# Patient Record
Sex: FEMALE | Race: WHITE | NOT HISPANIC OR LATINO
[De-identification: names, ages, dates, MRNs, and addresses within clinical notes are randomized per-mention and may not be internally consistent; named-entity substitution may affect disease eponyms.]

---

## 2018-12-05 ENCOUNTER — TRANSCRIPTION ENCOUNTER (OUTPATIENT)
Age: 53
End: 2018-12-05

## 2018-12-12 ENCOUNTER — TRANSCRIPTION ENCOUNTER (OUTPATIENT)
Age: 53
End: 2018-12-12

## 2019-07-01 ENCOUNTER — TRANSCRIPTION ENCOUNTER (OUTPATIENT)
Age: 54
End: 2019-07-01

## 2019-07-01 ENCOUNTER — EMERGENCY (EMERGENCY)
Facility: HOSPITAL | Age: 54
LOS: 1 days | Discharge: HOME | End: 2019-07-01
Attending: EMERGENCY MEDICINE
Payer: COMMERCIAL

## 2019-07-01 VITALS
OXYGEN SATURATION: 95 % | HEART RATE: 94 BPM | TEMPERATURE: 100 F | WEIGHT: 205.03 LBS | RESPIRATION RATE: 18 BRPM | DIASTOLIC BLOOD PRESSURE: 86 MMHG | HEIGHT: 63 IN | SYSTOLIC BLOOD PRESSURE: 140 MMHG

## 2019-07-01 LAB
ALBUMIN SERPL ELPH-MCNC: 4 G/DL — SIGNIFICANT CHANGE UP (ref 3.3–5)
ALP SERPL-CCNC: 83 U/L — SIGNIFICANT CHANGE UP (ref 40–120)
ALT FLD-CCNC: 16 U/L — SIGNIFICANT CHANGE UP (ref 10–45)
ANION GAP SERPL CALC-SCNC: 11 MMOL/L — SIGNIFICANT CHANGE UP (ref 5–17)
AST SERPL-CCNC: 16 U/L — SIGNIFICANT CHANGE UP (ref 10–40)
BASOPHILS # BLD AUTO: 0.1 K/UL — SIGNIFICANT CHANGE UP (ref 0–0.2)
BASOPHILS NFR BLD AUTO: 0.5 % — SIGNIFICANT CHANGE UP (ref 0–2)
BILIRUB SERPL-MCNC: 1.2 MG/DL — SIGNIFICANT CHANGE UP (ref 0.2–1.2)
BUN SERPL-MCNC: 10 MG/DL — SIGNIFICANT CHANGE UP (ref 7–23)
CALCIUM SERPL-MCNC: 9 MG/DL — SIGNIFICANT CHANGE UP (ref 8.4–10.5)
CHLORIDE SERPL-SCNC: 102 MMOL/L — SIGNIFICANT CHANGE UP (ref 96–108)
CO2 SERPL-SCNC: 24 MMOL/L — SIGNIFICANT CHANGE UP (ref 22–31)
CREAT SERPL-MCNC: 0.68 MG/DL — SIGNIFICANT CHANGE UP (ref 0.5–1.3)
D DIMER BLD IA.RAPID-MCNC: 218 NG/ML DDU — SIGNIFICANT CHANGE UP
EOSINOPHIL # BLD AUTO: 0.1 K/UL — SIGNIFICANT CHANGE UP (ref 0–0.5)
EOSINOPHIL NFR BLD AUTO: 1 % — SIGNIFICANT CHANGE UP (ref 0–6)
FLU A RESULT: SIGNIFICANT CHANGE UP
FLU A RESULT: SIGNIFICANT CHANGE UP
FLUAV AG NPH QL: SIGNIFICANT CHANGE UP
FLUBV AG NPH QL: SIGNIFICANT CHANGE UP
GLUCOSE SERPL-MCNC: 103 MG/DL — HIGH (ref 70–99)
HCT VFR BLD CALC: 44.7 % — SIGNIFICANT CHANGE UP (ref 34.5–45)
HGB BLD-MCNC: 15.3 G/DL — SIGNIFICANT CHANGE UP (ref 11.5–15.5)
LYMPHOCYTES # BLD AUTO: 18.6 % — SIGNIFICANT CHANGE UP (ref 13–44)
LYMPHOCYTES # BLD AUTO: 2 K/UL — SIGNIFICANT CHANGE UP (ref 1–3.3)
MCHC RBC-ENTMCNC: 32.1 PG — SIGNIFICANT CHANGE UP (ref 27–34)
MCHC RBC-ENTMCNC: 34.3 GM/DL — SIGNIFICANT CHANGE UP (ref 32–36)
MCV RBC AUTO: 93.7 FL — SIGNIFICANT CHANGE UP (ref 80–100)
MONOCYTES # BLD AUTO: 1 K/UL — HIGH (ref 0–0.9)
MONOCYTES NFR BLD AUTO: 9.6 % — SIGNIFICANT CHANGE UP (ref 2–14)
NEUTROPHILS # BLD AUTO: 7.6 K/UL — HIGH (ref 1.8–7.4)
NEUTROPHILS NFR BLD AUTO: 70.4 % — SIGNIFICANT CHANGE UP (ref 43–77)
PLATELET # BLD AUTO: 230 K/UL — SIGNIFICANT CHANGE UP (ref 150–400)
POTASSIUM SERPL-MCNC: 4.1 MMOL/L — SIGNIFICANT CHANGE UP (ref 3.5–5.3)
POTASSIUM SERPL-SCNC: 4.1 MMOL/L — SIGNIFICANT CHANGE UP (ref 3.5–5.3)
PROT SERPL-MCNC: 7.4 G/DL — SIGNIFICANT CHANGE UP (ref 6–8.3)
RBC # BLD: 4.78 M/UL — SIGNIFICANT CHANGE UP (ref 3.8–5.2)
RBC # FLD: 12.6 % — SIGNIFICANT CHANGE UP (ref 10.3–14.5)
RSV RESULT: SIGNIFICANT CHANGE UP
RSV RNA RESP QL NAA+PROBE: SIGNIFICANT CHANGE UP
SODIUM SERPL-SCNC: 137 MMOL/L — SIGNIFICANT CHANGE UP (ref 135–145)
TROPONIN T, HIGH SENSITIVITY RESULT: 7 NG/L — SIGNIFICANT CHANGE UP (ref 0–51)
TROPONIN T, HIGH SENSITIVITY RESULT: 7 NG/L — SIGNIFICANT CHANGE UP (ref 0–51)
WBC # BLD: 10.7 K/UL — HIGH (ref 3.8–10.5)
WBC # FLD AUTO: 10.7 K/UL — HIGH (ref 3.8–10.5)

## 2019-07-01 PROCEDURE — 99220: CPT

## 2019-07-01 PROCEDURE — 93971 EXTREMITY STUDY: CPT | Mod: 26

## 2019-07-01 PROCEDURE — 71046 X-RAY EXAM CHEST 2 VIEWS: CPT | Mod: 26

## 2019-07-01 PROCEDURE — 71045 X-RAY EXAM CHEST 1 VIEW: CPT | Mod: 26,59

## 2019-07-01 RX ORDER — IPRATROPIUM/ALBUTEROL SULFATE 18-103MCG
3 AEROSOL WITH ADAPTER (GRAM) INHALATION EVERY 4 HOURS
Refills: 0 | Status: DISCONTINUED | OUTPATIENT
Start: 2019-07-01 | End: 2019-07-05

## 2019-07-01 RX ORDER — DIAZEPAM 5 MG
5 TABLET ORAL EVERY 8 HOURS
Refills: 0 | Status: DISCONTINUED | OUTPATIENT
Start: 2019-07-01 | End: 2019-07-01

## 2019-07-01 RX ORDER — SODIUM CHLORIDE 9 MG/ML
1000 INJECTION INTRAMUSCULAR; INTRAVENOUS; SUBCUTANEOUS ONCE
Refills: 0 | Status: COMPLETED | OUTPATIENT
Start: 2019-07-01 | End: 2019-07-01

## 2019-07-01 RX ORDER — SODIUM CHLORIDE 9 MG/ML
1000 INJECTION INTRAMUSCULAR; INTRAVENOUS; SUBCUTANEOUS
Refills: 0 | Status: DISCONTINUED | OUTPATIENT
Start: 2019-07-01 | End: 2019-07-05

## 2019-07-01 RX ORDER — KETOROLAC TROMETHAMINE 30 MG/ML
15 SYRINGE (ML) INJECTION EVERY 6 HOURS
Refills: 0 | Status: DISCONTINUED | OUTPATIENT
Start: 2019-07-01 | End: 2019-07-01

## 2019-07-01 RX ORDER — ASPIRIN/CALCIUM CARB/MAGNESIUM 324 MG
162 TABLET ORAL ONCE
Refills: 0 | Status: COMPLETED | OUTPATIENT
Start: 2019-07-01 | End: 2019-07-01

## 2019-07-01 RX ADMIN — Medication 15 MILLIGRAM(S): at 20:55

## 2019-07-01 RX ADMIN — SODIUM CHLORIDE 1000 MILLILITER(S): 9 INJECTION INTRAMUSCULAR; INTRAVENOUS; SUBCUTANEOUS at 18:13

## 2019-07-01 RX ADMIN — SODIUM CHLORIDE 125 MILLILITER(S): 9 INJECTION INTRAMUSCULAR; INTRAVENOUS; SUBCUTANEOUS at 20:55

## 2019-07-01 RX ADMIN — Medication 5 MILLIGRAM(S): at 20:55

## 2019-07-01 RX ADMIN — Medication 3 MILLILITER(S): at 18:17

## 2019-07-01 RX ADMIN — Medication 15 MILLIGRAM(S): at 21:30

## 2019-07-01 RX ADMIN — Medication 162 MILLIGRAM(S): at 15:35

## 2019-07-01 NOTE — ED CDU PROVIDER INITIAL DAY NOTE - PROGRESS NOTE DETAILS
CDU NOTE DONNA Turner: pt resting, pt reports able to cough up sputum after neb treatment. no new complaints. NAD VSS. no events on tele. CDU PROGRESS NOTE DONNA HALL: Received pt at 1900 sign-out. Pt resting in stretcher in NAD. Case/plan reviewed. VSS. Pt ambulatory around unit with steady gait. S1 S2 noted, RRR, lungs CTA b/l, BS x4 with soft, nontender abdomen. Pt reports having discomfort on coughing but states she is feeling better. Will give Toradol 15mg IV and continue to monitor. CDU PROGRESS NOTE PA ISABEL: Pt resting comfortably, feeling well without complaint. NAD, VSS. No events on telemetry.

## 2019-07-01 NOTE — ED ADULT NURSE NOTE - OBJECTIVE STATEMENT
1245 54 yr old WF brought to ER by  for further eval and tx of upper chest pain x 3 days and low grade fever. A&Ox4. color pink. skin W&D. Coughing. smokes 1pack/day. Pain increase when moving around. Denies dizziness. No recent injury or heavy lifting

## 2019-07-01 NOTE — ED CDU PROVIDER INITIAL DAY NOTE - MEDICAL DECISION MAKING DETAILS
ATTG: : transfer to CDU for frequent reeval, vitals per routine, tele,  TTE, pain control, IVF, nebs PRN, +/- abx depending on if pt continues to spike fevers

## 2019-07-01 NOTE — ED PROVIDER NOTE - ATTENDING CONTRIBUTION TO CARE
53 y/o f with pmhx Tob smoker, presents for shortness of breath and chest pain since Friday. Worsening today and unable to sleep last night. went to an urgent care today and was found to have pain on back of right calf so was sent to ED for further work up and care. no fever measured at home. pain in middle of chest, cough non productive. unable to sleep last night from discomfort.  Gen.  no acute distress  HEENT:  perrl eomi  Lungs:  left base coarse bs, no retraction. O2 93- 95% on room air.  CVS: S1S2   Abd; soft non tender no distention    Ext: no pitting edema or erythema. no calf tenderness  Neuro: aaox3 no focal deficits  MSK:  strength 5/5 b/l upper and lower ext strength. ambulates in ED without difficulty.

## 2019-07-01 NOTE — ED PROVIDER NOTE - CLINICAL SUMMARY MEDICAL DECISION MAKING FREE TEXT BOX
ATTG: : chest pain / calf tenderness concern for Cardiac / pulm / vasc causes will check xray chest, check labs, check US, check ekg, re eval for dispo.

## 2019-07-01 NOTE — ED CDU PROVIDER DISPOSITION NOTE - PLAN OF CARE
1. Follow up with Medicine clinic, call [phone] to schedule appointment this week.  2. Show copies of your reports given to you. Take all of your other medications as previously prescribed.   3. Worsening or continued chest pain, shortness of breath, weakness, return to ED.

## 2019-07-01 NOTE — ED ADULT TRIAGE NOTE - OTHER COMPLAINTS
Pt was called three times to be traiged by=ut she claimed she did not hear the triage nurses calling her/ EKG done

## 2019-07-01 NOTE — ED PROVIDER NOTE - OBJECTIVE STATEMENT
54F hx obesity, daily smoker, p/w chest pain, pressure like, exertional, non-radiating, in middle of chest, assoc w/ sob (also exertional) constat, since saturday, worsening. Never had this pain before. Does not follow with a PMD or cardiologist.  Denies f/c, cough, ab pain, n/v/d.

## 2019-07-01 NOTE — ED ADULT NURSE NOTE - ED STAT RN HANDOFF DETAILS
hand off given to oncoming RN Sabina Hernandez. Droplet isolation initiated. Nasal swab was sent to lab. continues to c/o upper chest pain bilat 8/10 Dr espinosa. 2nd Troponin drawn and sent

## 2019-07-01 NOTE — ED CDU PROVIDER DISPOSITION NOTE - CLINICAL COURSE
54F hx obesity, daily smoker, presents to ED for mid-sternal chest pressure, severe, worse with deep inspiration, certain movement/positioning (also worse with lying flat), and deep palpation of chest. pt reports symptoms started Saturday (2 days ago) while doing her normal house work and since pain continues to worsen. pt states that she initially started to think she was becoming "sick" because has also been having on and off chills and raspy voice that began Saturday and returned today. +fever of 100.4 last night, and 100.2 here. no other complaints. denies n/v/d, abdominal pain, numbness/tingling, pain to jaw or arm. denies cough unusual to her. pt has chronic cough in mornings from smoking, but no new cough symptoms.  In ED, patient had ekg no signs of acute ischemia, Troponin x 2 negative, ddimer negative, flu and rsv negative, chest x ray showing left basilar atelectasis and US duplex lower extremity showing There is no evidence of proximal deep venous thrombosis.  Calf vein thrombosis and focal segmental deep venous thrombosis cannot be excluded. The patient should have a repeat lower extremity ultrasound in 5-7 days. Pt sent to CDU for frequent reeval, vitals q 4hrs, telemetry monitoring, Transthoracic Echo, pain control. 54F hx obesity, daily smoker, presents to ED for mid-sternal chest pressure, severe, worse with deep inspiration, certain movement/positioning (also worse with lying flat), and deep palpation of chest. pt reports symptoms started Saturday (2 days ago) while doing her normal house work and since pain continues to worsen. pt states that she initially started to think she was becoming "sick" because has also been having on and off chills and raspy voice that began Saturday and returned today. +fever of 100.4 last night, and 100.2 here. no other complaints. denies n/v/d, abdominal pain, numbness/tingling, pain to jaw or arm. denies cough unusual to her. pt has chronic cough in mornings from smoking, but no new cough symptoms.  In ED, patient had ekg no signs of acute ischemia, Troponin x 2 negative, ddimer negative, flu and rsv negative, chest x ray showing left basilar atelectasis and US duplex lower extremity showing There is no evidence of proximal deep venous thrombosis.  Calf vein thrombosis and focal segmental deep venous thrombosis cannot be excluded. The patient should have a repeat lower extremity ultrasound in 5-7 days. Pt sent to CDU for frequent reeval, vitals q 4hrs, telemetry monitoring, Transthoracic Echo, pain control.  Patient remained afebrile in CDU. No events on telemetry. Patient feeling much better in AM and throughout day. TTE unremarkable. Patient stable for d/c to f/u with cards and PCP as outpatient.

## 2019-07-01 NOTE — ED ADULT NURSE NOTE - NSIMPLEMENTINTERV_GEN_ALL_ED
Implemented All Universal Safety Interventions:  [place] to call system. Call bell, personal items and telephone within reach. Instruct patient to call for assistance. Room bathroom lighting operational. Non-slip footwear when patient is off stretcher. Physically safe environment: no spills, clutter or unnecessary equipment. Stretcher in lowest position, wheels locked, appropriate side rails in place.

## 2019-07-01 NOTE — ED CDU PROVIDER INITIAL DAY NOTE - ATTENDING CONTRIBUTION TO CARE
55 y/o f with pmhx Tob smoker, presents for shortness of breath and chest pain since Friday. Worsening today and unable to sleep last night. went to an urgent care today and was found to have pain on back of right calf so was sent to ED for further work up and care. no fever measured at home. pain in middle of chest, cough non productive. unable to sleep last night from discomfort.  Gen.  no acute distress  HEENT:  perrl eomi  Lungs:  left base coarse bs, no retraction. O2 93- 95% on room air.  CVS: S1S2   Abd; soft non tender no distention    Ext: no pitting edema or erythema. no calf tenderness  Neuro: aaox3 no focal deficits  MSK:  strength 5/5 b/l upper and lower ext strength. ambulates in ED without difficulty.

## 2019-07-01 NOTE — ED CDU PROVIDER DISPOSITION NOTE - NSFOLLOWUPINSTRUCTIONS_ED_ALL_ED_FT
Rest. Stay well hydrated.     Follow up with your PMD and cardiologist upon discharge; show copies of your reports given to you.    You may follow up with [facility]: call [phone] to make appointment.   You may follow up with [facility], please call [phone] to make an appointment.     ***Be sure to follow up for repeat doppler of your leg.     Return to ED for worsening or continued chest pain, shortness of breath, weakness, or any other concerning symptoms.

## 2019-07-01 NOTE — ED CDU PROVIDER INITIAL DAY NOTE - DETAILS
frequent reeval, vitals per routine, tele,  TTE, pain control, IVF, nebs PRN, +/- abx depending on if pt continues to spike fevers  d/w attending

## 2019-07-01 NOTE — ED CDU PROVIDER DISPOSITION NOTE - ATTENDING CONTRIBUTION TO CARE
Patient serially evaluated throughout emergency department course. There was no acute deterioration up to this time in the department. Patient has demonstrated marked clinical improvement, feels better at this time according to emergency department team. Agree with goals/plan of emergency department care as described in electronic medical record, including diagnostics, therapeutics and consultation as clinically warranted. Will discharge home with close outpatient followup with primary care physician/provider and specialist if necessary. Patient educated on concerning signs and features to return to the emergency department including but not limited to: nausea, vomiting, fever, chills, persistent/worsening symptoms or any concerns at all. No immediate life threatening issues present on history or clinical exam. Patient is a safe disposition home, has capacity and insight into their condition, is ambulatory in the Emergency Department with no further questions and will follow up with their doctor(s) this week. Patient understands anticipatory guidance and was given strict return and follow up precautions. The patient has been informed of all concerning signs and symptoms to return to Emergency Department, the necessity to follow up with the PMD/Clinic/follow up provided within 2-3 days was explained, and the patient reports understanding of above with capacity and insight.

## 2019-07-01 NOTE — ED CDU PROVIDER INITIAL DAY NOTE - OBJECTIVE STATEMENT
54F hx obesity, daily smoker, presents to ED for mid-sternal chest pressure, severe, worse with deep inspiration, certain movement/positioning (also worse with lying flat), and deep palpation of chest. pt reports symptoms started Saturday (2 days ago) while doing her normal house work and since pain continues to worsen. pt states that she initally started to think she was becoming "sick" because has also been having on and off chills and raspy voice that began saturday and returned today. +fever of 100.4 last night, and 100.2 here. some dizziness if bend forward and then stand straight. no other complaints. +sob.  denies n/v/d, abdominal pain, numbness/tingling, pain to jaw or arm. denies cough unusual to her. pt has chronic cough in mornings from smoking, but no new cough symptoms.

## 2019-07-02 VITALS
RESPIRATION RATE: 18 BRPM | OXYGEN SATURATION: 94 % | TEMPERATURE: 99 F | HEART RATE: 75 BPM | DIASTOLIC BLOOD PRESSURE: 65 MMHG | SYSTOLIC BLOOD PRESSURE: 106 MMHG

## 2019-07-02 LAB
BASOPHILS # BLD AUTO: 0.1 K/UL — SIGNIFICANT CHANGE UP (ref 0–0.2)
BASOPHILS NFR BLD AUTO: 0.6 % — SIGNIFICANT CHANGE UP (ref 0–2)
CHOLEST SERPL-MCNC: 198 MG/DL — SIGNIFICANT CHANGE UP (ref 10–199)
EOSINOPHIL # BLD AUTO: 0.2 K/UL — SIGNIFICANT CHANGE UP (ref 0–0.5)
EOSINOPHIL NFR BLD AUTO: 1.7 % — SIGNIFICANT CHANGE UP (ref 0–6)
HBA1C BLD-MCNC: 5.6 % — SIGNIFICANT CHANGE UP (ref 4–5.6)
HCT VFR BLD CALC: 43.4 % — SIGNIFICANT CHANGE UP (ref 34.5–45)
HDLC SERPL-MCNC: 40 MG/DL — LOW
HGB BLD-MCNC: 14.3 G/DL — SIGNIFICANT CHANGE UP (ref 11.5–15.5)
LIPID PNL WITH DIRECT LDL SERPL: 141 MG/DL — HIGH
LYMPHOCYTES # BLD AUTO: 2.2 K/UL — SIGNIFICANT CHANGE UP (ref 1–3.3)
LYMPHOCYTES # BLD AUTO: 23.6 % — SIGNIFICANT CHANGE UP (ref 13–44)
MCHC RBC-ENTMCNC: 30.9 PG — SIGNIFICANT CHANGE UP (ref 27–34)
MCHC RBC-ENTMCNC: 33 GM/DL — SIGNIFICANT CHANGE UP (ref 32–36)
MCV RBC AUTO: 93.4 FL — SIGNIFICANT CHANGE UP (ref 80–100)
MONOCYTES # BLD AUTO: 0.8 K/UL — SIGNIFICANT CHANGE UP (ref 0–0.9)
MONOCYTES NFR BLD AUTO: 8.8 % — SIGNIFICANT CHANGE UP (ref 2–14)
NEUTROPHILS # BLD AUTO: 6 K/UL — SIGNIFICANT CHANGE UP (ref 1.8–7.4)
NEUTROPHILS NFR BLD AUTO: 65.3 % — SIGNIFICANT CHANGE UP (ref 43–77)
PLATELET # BLD AUTO: 221 K/UL — SIGNIFICANT CHANGE UP (ref 150–400)
RBC # BLD: 4.65 M/UL — SIGNIFICANT CHANGE UP (ref 3.8–5.2)
RBC # FLD: 12.7 % — SIGNIFICANT CHANGE UP (ref 10.3–14.5)
TOTAL CHOLESTEROL/HDL RATIO MEASUREMENT: 5 RATIO — SIGNIFICANT CHANGE UP (ref 3.3–7.1)
TRIGL SERPL-MCNC: 85 MG/DL — SIGNIFICANT CHANGE UP (ref 10–149)
WBC # BLD: 9.2 K/UL — SIGNIFICANT CHANGE UP (ref 3.8–10.5)
WBC # FLD AUTO: 9.2 K/UL — SIGNIFICANT CHANGE UP (ref 3.8–10.5)

## 2019-07-02 PROCEDURE — 85027 COMPLETE CBC AUTOMATED: CPT

## 2019-07-02 PROCEDURE — 93306 TTE W/DOPPLER COMPLETE: CPT | Mod: 26

## 2019-07-02 PROCEDURE — 84484 ASSAY OF TROPONIN QUANT: CPT

## 2019-07-02 PROCEDURE — 80061 LIPID PANEL: CPT

## 2019-07-02 PROCEDURE — 83036 HEMOGLOBIN GLYCOSYLATED A1C: CPT

## 2019-07-02 PROCEDURE — 85379 FIBRIN DEGRADATION QUANT: CPT

## 2019-07-02 PROCEDURE — 93971 EXTREMITY STUDY: CPT

## 2019-07-02 PROCEDURE — 71045 X-RAY EXAM CHEST 1 VIEW: CPT

## 2019-07-02 PROCEDURE — 93306 TTE W/DOPPLER COMPLETE: CPT

## 2019-07-02 PROCEDURE — 99217: CPT

## 2019-07-02 PROCEDURE — 87631 RESP VIRUS 3-5 TARGETS: CPT

## 2019-07-02 PROCEDURE — 96374 THER/PROPH/DIAG INJ IV PUSH: CPT

## 2019-07-02 PROCEDURE — 71046 X-RAY EXAM CHEST 2 VIEWS: CPT

## 2019-07-02 PROCEDURE — G0378: CPT

## 2019-07-02 PROCEDURE — 94640 AIRWAY INHALATION TREATMENT: CPT

## 2019-07-02 PROCEDURE — 99284 EMERGENCY DEPT VISIT MOD MDM: CPT | Mod: 25

## 2019-07-02 PROCEDURE — 80053 COMPREHEN METABOLIC PANEL: CPT

## 2019-07-02 PROCEDURE — 93005 ELECTROCARDIOGRAM TRACING: CPT

## 2019-07-02 RX ADMIN — Medication 3 MILLILITER(S): at 10:59

## 2019-07-02 NOTE — ED ADULT NURSE REASSESSMENT NOTE - NS ED NURSE REASSESS COMMENT FT1
16.30  Pt is reviewed by CDU MD Rodrick George  with all the results. pt is discharged ML out.  DONNA Randolph explained the follow up care & gave the discharge summary. Pt verbalized the understanding on follow up care . Pt has steady gait  stable vitals  Rivka CP SOB   Pt stable to go home.
Pt report received from Denice HYATT. Pt transferred to cdu Bed 7 for cardiac monitoring and Echo. Pt a/ox3 denies respiratory distress, sob, dyspnea, C/P, palpitations, n/v/d at this time. Pt states symptoms have improved. VSS, afebrile, IV clean/dry/intact. Pt aware of plan of care, call bell within reach ,safety maintained. Will monitor and assess.
Report received from EDMAR Kimball. Pt a&oX4, resting comfortably in bed in no apparent distress, breathing spontaneously and unlabored. VSS. Pt reports chest discomfort/tenderness. Pt denies numbness/tingling. CDU at bedside for evaluation. Plan is for pt to go to CDU after RVP results. Pt aware of plan of care. safety maintained.
07.00 Am  Pt received from EDMAR Angela pt is observed for SOB  09.00  Am  Pt reassessed  Pt oriented to CDU & plan of care was discussed. Pt is  AXOX4..denies N/V/D fever chills CP  .Pt states her SOB is improving post nebulized treatments . pt is not wheezing  Safety & comfort measures maintained. Call bell in reach. Pt advised to call for help if needed IV  site looks clean &Dry no signs of infiltration noted  Will continue to monitor.
Pt received from EDMAR Louie. Pt oriented to CDU & plan of care was discussed. Pt endorses unchanged 8/10 chest pain described as pressure. Pain is not worse with palpation but pt states she has soreness near the breasts and the pressure is exacerbated with movement. DONNA Mg aware. Medicated as per MAR. SOB noted while speaking to pt which patient says is not normal for her and started about 3 days ago. Pt states that nebulizer treatment is helping break up mucus and is allowing her to cough up phlegm. On examination clear breath sounds noted. Pt placed on continuous pulse ox for further management sating >04% on room air. Pt denies any dizziness or palpitations. Safety & comfort measures maintained. Call bell in reach. Will continue to monitor.

## 2019-07-02 NOTE — ED ADULT NURSE REASSESSMENT NOTE - NSIMPLEMENTINTERV_GEN_ALL_ED
Implemented All Universal Safety Interventions:  [place] to call system. Call bell, personal items and telephone within reach. Instruct patient to call for assistance. Room bathroom lighting operational. Non-slip footwear when patient is off stretcher. Physically safe environment: no spills, clutter or unnecessary equipment. Stretcher in lowest position, wheels locked, appropriate side rails in place.

## 2019-07-02 NOTE — ED CDU PROVIDER SUBSEQUENT DAY NOTE - HISTORY
CDU PROGRESS NOTE PA ISABEL: Pt resting comfortably, without complaint. NAD, VSS. No events on telemetry.

## 2019-07-02 NOTE — ED ADULT NURSE REASSESSMENT NOTE - RESPIRATORY WDL
Breathing spontaneous and unlabored. Breath sounds clear and equal bilaterally with regular rhythm.
Breathing spontaneous and unlabored with regular rhythm.

## 2019-07-02 NOTE — ED ADULT NURSE REASSESSMENT NOTE - COMFORT CARE
po fluids offered/ambulated to bathroom/plan of care explained
plan of care explained/po fluids offered

## 2019-07-02 NOTE — ED CDU PROVIDER SUBSEQUENT DAY NOTE - PROGRESS NOTE DETAILS
Patient taken for echo during signout at 0705. Will evaluate upon arrival back to CDU. Pt reports improvement of chest discomfort. No longer feels SOB/pleuritic pain. No fever/chills. + cough productive of yellow sputum today. VSS. Lungs CTA b/l on exam. No events on telemetry. Pending TTE results. Patient resting in bed comfortably in NAD. No complaints. Most recent VSS. No events on telemetry monitor. Pending TTE results. TTE unremarkable. Patient reports she has felt much better all day. VSS. On exam, lungs remain CTA b/l. No events on telemetry. Patient stable for d/c home. Advised smoking cessation. D/w Dr. Mensah. TTE unremarkable. Patient reports she has felt much better all day. VSS. On exam, lungs remain CTA b/l. No events on telemetry. Patient stable for d/c home. Reports she will f/u with her 's cardiologist. Will also provide PCP f/u with [facility] clinic. Advised smoking cessation. D/w Dr. Mensah. TTE unremarkable. Patient reports she has felt much better all day. VSS. On exam, lungs remain CTA b/l. No events on telemetry. Patient stable for d/c home. Reports she will f/u with her 's cardiologist. Will also provide PCP f/u with [facility] clinic. Results discussed including but not limited to lipid panel results and RLE doppler with recommended f/u for repeat doppler in 5-7 days. Advised smoking cessation. D/w Dr. Mensah.

## 2019-07-02 NOTE — ED CDU PROVIDER SUBSEQUENT DAY NOTE - MEDICAL DECISION MAKING DETAILS
See observation monitoring plan section from initial day note.   Patient with cough, history of smoking, yellow sputum production, pending tte   if within normal limits patient to be discharged and  follow up with pmd

## 2019-08-26 ENCOUNTER — APPOINTMENT (OUTPATIENT)
Dept: INTERNAL MEDICINE | Facility: CLINIC | Age: 54
End: 2019-08-26
Payer: COMMERCIAL

## 2019-08-26 ENCOUNTER — NON-APPOINTMENT (OUTPATIENT)
Age: 54
End: 2019-08-26

## 2019-08-26 VITALS
SYSTOLIC BLOOD PRESSURE: 130 MMHG | HEART RATE: 94 BPM | TEMPERATURE: 98.1 F | WEIGHT: 204 LBS | DIASTOLIC BLOOD PRESSURE: 70 MMHG | OXYGEN SATURATION: 95 %

## 2019-08-26 VITALS — BODY MASS INDEX: 37.31 KG/M2 | HEIGHT: 62 IN

## 2019-08-26 DIAGNOSIS — L72.9 FOLLICULAR CYST OF THE SKIN AND SUBCUTANEOUS TISSUE, UNSPECIFIED: ICD-10-CM

## 2019-08-26 PROCEDURE — 90732 PPSV23 VACC 2 YRS+ SUBQ/IM: CPT

## 2019-08-26 PROCEDURE — G0009: CPT

## 2019-08-26 PROCEDURE — 99406 BEHAV CHNG SMOKING 3-10 MIN: CPT | Mod: 25

## 2019-08-26 PROCEDURE — 99204 OFFICE O/P NEW MOD 45 MIN: CPT | Mod: 25

## 2019-08-26 NOTE — PHYSICAL EXAM
[No Acute Distress] : no acute distress [Well Nourished] : well nourished [Well Developed] : well developed [Well-Appearing] : well-appearing [Normal Sclera/Conjunctiva] : normal sclera/conjunctiva [PERRL] : pupils equal round and reactive to light [Normal Outer Ear/Nose] : the outer ears and nose were normal in appearance [EOMI] : extraocular movements intact [No JVD] : no jugular venous distention [Normal Oropharynx] : the oropharynx was normal [No Lymphadenopathy] : no lymphadenopathy [Supple] : supple [Thyroid Normal, No Nodules] : the thyroid was normal and there were no nodules present [No Respiratory Distress] : no respiratory distress  [No Accessory Muscle Use] : no accessory muscle use [Clear to Auscultation] : lungs were clear to auscultation bilaterally [Regular Rhythm] : with a regular rhythm [Normal Rate] : normal rate  [Normal S1, S2] : normal S1 and S2 [No Murmur] : no murmur heard [No Carotid Bruits] : no carotid bruits [No Abdominal Bruit] : a ~M bruit was not heard ~T in the abdomen [No Varicosities] : no varicosities [Pedal Pulses Present] : the pedal pulses are present [No Edema] : there was no peripheral edema [No Palpable Aorta] : no palpable aorta [No Extremity Clubbing/Cyanosis] : no extremity clubbing/cyanosis [Normal Appearance] : normal in appearance [No Axillary Lymphadenopathy] : no axillary lymphadenopathy [Soft] : abdomen soft [Non Tender] : non-tender [Non-distended] : non-distended [No Masses] : no abdominal mass palpated [No HSM] : no HSM [Normal Bowel Sounds] : normal bowel sounds [Normal Posterior Cervical Nodes] : no posterior cervical lymphadenopathy [Normal Anterior Cervical Nodes] : no anterior cervical lymphadenopathy [No CVA Tenderness] : no CVA  tenderness [No Spinal Tenderness] : no spinal tenderness [No Joint Swelling] : no joint swelling [Grossly Normal Strength/Tone] : grossly normal strength/tone [No Rash] : no rash [Coordination Grossly Intact] : coordination grossly intact [No Focal Deficits] : no focal deficits [Normal Gait] : normal gait [Deep Tendon Reflexes (DTR)] : deep tendon reflexes were 2+ and symmetric [Normal Affect] : the affect was normal [de-identified] : lesion right aspect of nose [Normal Insight/Judgement] : insight and judgment were intact [de-identified] : anxious

## 2019-08-26 NOTE — ASSESSMENT
[FreeTextEntry1] : New pt with above issues;\par has not had any care x yrs\par Needs to catch up with HCMs in addition to getting help with anxiety and smoking.\par Wt loss, also urgent. Starting WW w .\par Positive  f/h CAD early in parents.\par \par Pneumovax 23 given\par Chantix sent\par labs uptodate\par Refer to :\par Cardiol\par Gyn\par Derm\par Psych\par \par Mammo\par FIT\par \par Requests Klonopin which has helped in past, we discussed addictive potential which she is aware;\par wants to tackle smoking w chantix before going on SSRI.\par f/u 1-2 mos\par \par Gy

## 2019-08-26 NOTE — COUNSELING
[Behavioral health counseling provided] : Behavioral health counseling provided [Cessation strategies including cessation program discussed] : Cessation strategies including cessation program discussed [Tobacco Use Cessation Intermediate Greater Than 3 Minutes Up to 10 Minutes] : Tobacco Use Cessation Intermediate Greater Than 3 Minutes Up to 10 Minutes [Willing to Quit Smoking] : Willing to quit smoking [Benefits of weight loss discussed] : Benefits of weight loss discussed [Structured Weight Management Program suggested:] : Structured weight management program suggested [FreeTextEntry1] : [place]

## 2019-08-26 NOTE — HISTORY OF PRESENT ILLNESS
[FreeTextEntry1] : cpe-new [de-identified] : 53 yo woman to establish care.\par No prior PCP\par \par \par h/o obesity, anxiety, agorophobia, smoker.\par \par Recently went to [facility] care w low gr temp and calf pain,  URI also CP/pressure-\par sent to ER. was kept overnight-CXR atelectasis, d-dimer nl, LE dopplers nl, labs/troponin nl, cholest sl elev, \par treated w aspirin 81 mg, Ketorolac IV, Valium 5 mg, albuterol neb.\par IV fluids.\par Felt much better and pain was gone; she says it was viral and anxiety.\par Has not been under the care of a therapist or psychiatrist, feels she needs help.\par Motivated  to quit smoking, smokes one PPD x 30 yrs, did quit during one preg, not the other;\par  states wellbutrin did not work but Chantix worked well in the past (took her dad's after he ).\par Has been on [place], zoloft, Klonopin, wellbutrin over the yrs, nothing recently.\par Tianna uses Zquill and/or CBD oil to sleep.\par High stress levels- son, 22, has special needs/hi func autism, she drives him every day to programs and cares for whole family, not taking care of herself.\par Has not tried any diets/wt loss programs but plans to do WW with her husb; also not active, mostly "in the car" all day.\par \par Mammo:yrs\par Gyn: yrs\par [place]: no\par Derm:no\par \par Vax;no\par \par f/h Mother d. 54 MI\par      Father first MI age 39, d. 68 smoker/drinker\par

## 2019-08-26 NOTE — HEALTH RISK ASSESSMENT
[Fair] :  ~his/her~ mood as fair [] : Yes [No] : No [1] : 2) Feeling down, depressed, or hopeless for several days (1) [HIV test declined] : HIV test declined [Hepatitis C test declined] : Hepatitis C test declined [Behavioral] : behavioral [With Family] : lives with family [Unemployed] : unemployed [Patient declined mammogram] : Patient declined mammogram [Patient declined PAP Smear] : Patient declined PAP Smear [Patient declined bone density test] : Patient declined bone density test [Patient declined colonoscopy] : Patient declined colonoscopy [] :  [# Of Children ___] : has [unfilled] children [Fully functional (bathing, dressing, toileting, transferring, walking, feeding)] : Fully functional (bathing, dressing, toileting, transferring, walking, feeding) [Feels Safe at Home] : Feels safe at home [Fully functional (using the telephone, shopping, preparing meals, housekeeping, doing laundry, using] : Fully functional and needs no help or supervision to perform IADLs (using the telephone, shopping, preparing meals, housekeeping, doing laundry, using transportation, managing medications and managing finances) [Carbon Monoxide Detector] : carbon monoxide detector [Smoke Detector] : smoke detector [Seat Belt] :  uses seat belt [Safety elements used in home] : safety elements used in home [Sunscreen] : uses sunscreen [Caregiver Concerns] : has caregiver concerns [Language] : denies difficulty with language [Change in mental status noted] : No change in mental status noted [Reports changes in vision] : Reports no changes in vision [Reports changes in hearing] : Reports no changes in hearing [Reports changes in dental health] : Reports no changes in dental health [Travel to Developing Areas] : does not  travel to developing areas [FreeTextEntry3] : dgtr in college, son special needs

## 2019-09-26 ENCOUNTER — MEDICATION RENEWAL (OUTPATIENT)
Age: 54
End: 2019-09-26

## 2019-10-28 ENCOUNTER — APPOINTMENT (OUTPATIENT)
Dept: INTERNAL MEDICINE | Facility: CLINIC | Age: 54
End: 2019-10-28
Payer: COMMERCIAL

## 2019-10-28 VITALS
SYSTOLIC BLOOD PRESSURE: 122 MMHG | HEIGHT: 62 IN | DIASTOLIC BLOOD PRESSURE: 88 MMHG | TEMPERATURE: 98.1 F | HEART RATE: 95 BPM | OXYGEN SATURATION: 98 % | WEIGHT: 260 LBS | BODY MASS INDEX: 47.84 KG/M2

## 2019-10-28 PROCEDURE — G0008: CPT

## 2019-10-28 PROCEDURE — 99214 OFFICE O/P EST MOD 30 MIN: CPT | Mod: 25

## 2019-10-28 PROCEDURE — 90686 IIV4 VACC NO PRSV 0.5 ML IM: CPT

## 2019-10-28 RX ORDER — VARENICLINE TARTRATE 0.5 (11)-1
0.5 MG X 11 & KIT ORAL
Qty: 1 | Refills: 3 | Status: DISCONTINUED | COMMUNITY
Start: 2019-08-26 | End: 2019-10-28

## 2019-10-28 NOTE — HISTORY OF PRESENT ILLNESS
[FreeTextEntry1] : f/u [de-identified] : obesity, agorophobia, smoking\par \par dgtr home from college w [place] now back, but threw off her self care plans-eg mammo, gyn,cardiol evals\par \par would llike to try Wellbutrin, has done well on it\par Chantix did not tolerate\par \par starting medifast diet w neighbor who does life coaching from her Jew\par \par Fell off ladder last wkd, has bruises but otherwise ok; was putting something in attic\par

## 2019-10-28 NOTE — ASSESSMENT
[FreeTextEntry1] : Luis Fernando get back on track to catch up on HCMs\par Counseling, Prema CRABTREE at [phone], will call.\par Smoking cessation discussed, will try Wellbutrin; has clon, rarely using\par check thy (all else recent)\par flu shot

## 2019-10-29 LAB — TSH SERPL-ACNC: 3.6 UIU/ML

## 2019-11-13 ENCOUNTER — APPOINTMENT (OUTPATIENT)
Dept: INTERNAL MEDICINE | Facility: CLINIC | Age: 54
End: 2019-11-13

## 2019-11-25 ENCOUNTER — APPOINTMENT (OUTPATIENT)
Dept: INTERNAL MEDICINE | Facility: CLINIC | Age: 54
End: 2019-11-25

## 2020-02-03 ENCOUNTER — APPOINTMENT (OUTPATIENT)
Dept: INTERNAL MEDICINE | Facility: CLINIC | Age: 55
End: 2020-02-03
Payer: COMMERCIAL

## 2020-02-03 VITALS
SYSTOLIC BLOOD PRESSURE: 134 MMHG | DIASTOLIC BLOOD PRESSURE: 78 MMHG | TEMPERATURE: 98.6 F | OXYGEN SATURATION: 97 % | HEART RATE: 87 BPM | BODY MASS INDEX: 36.07 KG/M2 | WEIGHT: 196 LBS | HEIGHT: 62 IN

## 2020-02-03 PROCEDURE — 99214 OFFICE O/P EST MOD 30 MIN: CPT

## 2020-02-03 RX ORDER — ESCITALOPRAM OXALATE 10 MG/1
10 TABLET ORAL
Qty: 60 | Refills: 3 | Status: DISCONTINUED | COMMUNITY
Start: 2019-11-13 | End: 2020-02-03

## 2020-02-04 NOTE — HISTORY OF PRESENT ILLNESS
[FreeTextEntry1] : f/u [de-identified] : 55 yo woman w obesity, agorophobia, smoking, anxiety, insomnia\par for f/u-\par has list:\par Had been doing well w wt loss on [employer] program (medifast type) until holidays- felt much better, now getting back on track\par Had been seeing Prema and needs to resume visits, then probably needs Psychiatrist.\par on Wellbutrin which is helping her cut down the smoking from 1.5 PPD to 10 cigs/d. Lobo caused diarrhea\par still c/o lump right axilla (fat pad, see exam); had Mammo nl\par Insomnia\par Trigger thumb-declines treatmt\par achy shoulders, if wakes up middle of night\par Not exercising\par ER for CP, w/u neg\par

## 2020-02-04 NOTE — COUNSELING
[Benefits of weight loss discussed] : Benefits of weight loss discussed [Potential consequences of obesity discussed] : Potential consequences of obesity discussed [Structured Weight Management Program suggested:] : Structured weight management program suggested [Encouraged to maintain food diary] : Encouraged to maintain food diary [Encouraged to increase physical activity] : Encouraged to increase physical activity

## 2020-02-04 NOTE — ASSESSMENT
[FreeTextEntry1] : Pt as described.\par Contin efforts at wt loss; declines referral to obesity team\par Contin efforts at smoking cessation, declines smoking group referral at this time, declines patches.\par f/u w Behav health\par Check US of right axilla as pt is concerned.

## 2020-02-26 ENCOUNTER — NON-APPOINTMENT (OUTPATIENT)
Age: 55
End: 2020-02-26

## 2020-02-26 ENCOUNTER — APPOINTMENT (OUTPATIENT)
Dept: CARDIOLOGY | Facility: CLINIC | Age: 55
End: 2020-02-26
Payer: COMMERCIAL

## 2020-02-26 VITALS
HEIGHT: 62 IN | BODY MASS INDEX: 36.25 KG/M2 | HEART RATE: 85 BPM | TEMPERATURE: 99.1 F | WEIGHT: 197 LBS | SYSTOLIC BLOOD PRESSURE: 122 MMHG | DIASTOLIC BLOOD PRESSURE: 82 MMHG | OXYGEN SATURATION: 98 %

## 2020-02-26 DIAGNOSIS — R07.1 CHEST PAIN ON BREATHING: ICD-10-CM

## 2020-02-26 PROCEDURE — 93000 ELECTROCARDIOGRAM COMPLETE: CPT

## 2020-02-26 PROCEDURE — 99204 OFFICE O/P NEW MOD 45 MIN: CPT

## 2020-02-26 NOTE — ASSESSMENT
[FreeTextEntry1] : Assessment:\par 1.  Smoker\par 2.  Overweight\par 3.  Dyspnea on exertion\par \par \par Plan:\par 1.  lab work today - lipid panel\par 2.  Smoking cessation\par 3.  Diet and exercise\par 4.  Exercise ECG stress test\par 5.  Carotid duplex\par 6.  coronary calcium score\par 7.  Await studies.   will need to consider statin therapy

## 2020-02-26 NOTE — REASON FOR VISIT
[Initial Evaluation] : an initial evaluation of [FreeTextEntry1] : 55F smoker.  \par Summer 2019, felt pressure in the chest, sob, went to the ER.  Troponin neg x 2, echo normal LV function, dimer negative.  Had chills that day as well.  Slept poorly.  observed overnight and sent home.  was told it was viral.  \par \par Smoker 1ppd - 1.5ppd.  Tried chantix, wellbutrin.  Off the chantix.  Now down to half a pack. \par \par Dyspnea with exertion Up a flight. No chest pressure with exertion.  Does not have an inhaler.  Does not see a pulmonary\par \par MedicationS;\par Wellbutrin\par CBD oil\par Klonazapam PRN\par \par \par No palpitations.  Some mild edema with prolonged standing.  \par Father with MI at age 39, RA, Lupus.   68 bronchitis /pna/sepsis\par Mother -  MI at age 54\par \par \par \par \par

## 2020-02-26 NOTE — PHYSICAL EXAM
[Normal Appearance] : normal appearance [General Appearance - Well Developed] : well developed [General Appearance - Well Nourished] : well nourished [Well Groomed] : well groomed [General Appearance - In No Acute Distress] : no acute distress [No Deformities] : no deformities [Normal Conjunctiva] : the conjunctiva exhibited no abnormalities [Eyelids - No Xanthelasma] : the eyelids demonstrated no xanthelasmas [Normal Oral Mucosa] : normal oral mucosa [No Oral Pallor] : no oral pallor [No Oral Cyanosis] : no oral cyanosis [Normal Jugular Venous V Waves Present] : normal jugular venous V waves present [Normal Jugular Venous A Waves Present] : normal jugular venous A waves present [No Jugular Venous Engel A Waves] : no jugular venous engel A waves [Heart Rate And Rhythm] : heart rate and rhythm were normal [Murmurs] : no murmurs present [Heart Sounds] : normal S1 and S2 [Respiration, Rhythm And Depth] : normal respiratory rhythm and effort [Exaggerated Use Of Accessory Muscles For Inspiration] : no accessory muscle use [Abdomen Soft] : soft [Auscultation Breath Sounds / Voice Sounds] : lungs were clear to auscultation bilaterally [Abdomen Tenderness] : non-tender [Abnormal Walk] : normal gait [Abdomen Mass (___ Cm)] : no abdominal mass palpated [Gait - Sufficient For Exercise Testing] : the gait was sufficient for exercise testing [Petechial Hemorrhages (___cm)] : no petechial hemorrhages [Cyanosis, Localized] : no localized cyanosis [Nail Clubbing] : no clubbing of the fingernails [Skin Color & Pigmentation] : normal skin color and pigmentation [No Venous Stasis] : no venous stasis [] : no rash [Skin Lesions] : no skin lesions [No Skin Ulcers] : no skin ulcer [No Xanthoma] : no  xanthoma was observed [Oriented To Time, Place, And Person] : oriented to person, place, and time [Mood] : the mood was normal [Affect] : the affect was normal [No Anxiety] : not feeling anxious

## 2020-02-27 LAB
CHOLEST SERPL-MCNC: 249 MG/DL
CHOLEST/HDLC SERPL: 5.9 RATIO
HDLC SERPL-MCNC: 42 MG/DL
LDLC SERPL CALC-MCNC: 172 MG/DL
NT-PROBNP SERPL-MCNC: 20 PG/ML
TRIGL SERPL-MCNC: 171 MG/DL

## 2020-03-05 ENCOUNTER — RX RENEWAL (OUTPATIENT)
Age: 55
End: 2020-03-05

## 2021-11-05 ENCOUNTER — APPOINTMENT (OUTPATIENT)
Dept: INTERNAL MEDICINE | Facility: CLINIC | Age: 56
End: 2021-11-05
Payer: COMMERCIAL

## 2021-11-05 VITALS — SYSTOLIC BLOOD PRESSURE: 142 MMHG | DIASTOLIC BLOOD PRESSURE: 90 MMHG

## 2021-11-05 VITALS
HEIGHT: 62 IN | OXYGEN SATURATION: 97 % | SYSTOLIC BLOOD PRESSURE: 140 MMHG | TEMPERATURE: 97.6 F | HEART RATE: 78 BPM | WEIGHT: 210 LBS | BODY MASS INDEX: 38.64 KG/M2 | DIASTOLIC BLOOD PRESSURE: 85 MMHG

## 2021-11-05 DIAGNOSIS — F41.1 GENERALIZED ANXIETY DISORDER: ICD-10-CM

## 2021-11-05 DIAGNOSIS — Z72.0 TOBACCO USE: ICD-10-CM

## 2021-11-05 DIAGNOSIS — F32.A DEPRESSION, UNSPECIFIED: ICD-10-CM

## 2021-11-05 DIAGNOSIS — M79.621 PAIN IN RIGHT UPPER ARM: ICD-10-CM

## 2021-11-05 PROCEDURE — G0008: CPT

## 2021-11-05 PROCEDURE — 90686 IIV4 VACC NO PRSV 0.5 ML IM: CPT

## 2021-11-05 PROCEDURE — 99396 PREV VISIT EST AGE 40-64: CPT | Mod: 25

## 2021-11-05 RX ORDER — CLONAZEPAM 0.5 MG/1
0.5 TABLET, ORALLY DISINTEGRATING ORAL DAILY
Qty: 20 | Refills: 0 | Status: DISCONTINUED | COMMUNITY
Start: 2019-08-26 | End: 2021-11-05

## 2021-11-05 RX ORDER — VARENICLINE TARTRATE 1 MG/1
1 TABLET, FILM COATED ORAL
Qty: 56 | Refills: 0 | Status: DISCONTINUED | COMMUNITY
Start: 2020-03-05 | End: 2021-11-05

## 2021-11-05 RX ORDER — ZOSTER VACCINE RECOMBINANT, ADJUVANTED 50 MCG/0.5
50 KIT INTRAMUSCULAR
Qty: 1 | Refills: 0 | Status: ACTIVE | COMMUNITY
Start: 2021-11-05 | End: 1900-01-01

## 2021-11-05 NOTE — ASSESSMENT
[FreeTextEntry1] : Pt as outlined.\par BP elev today- we discussed salt reduction, wt loss, exer, monitor BP at home.\par Smoking cessation discussed, will start Wellbutrin which has helped before, also may help w wt.\par Wt loss strategies incl exer and [employer] program.\par Mammo/US atten to right axilla\par FIT test, ref [facility]\par routine labs\par shingrix to pharm\par flu shot\par \par f/u one mo BP check\par

## 2021-11-05 NOTE — COUNSELING
[Cessation strategies including cessation program discussed] : Cessation strategies including cessation program discussed [Structured Weight Management Program suggested:] : Structured weight management program suggested

## 2021-11-05 NOTE — HEALTH RISK ASSESSMENT
[] : Yes [Yes] : Yes [0] : 1) Little interest or pleasure doing things: Not at all (0) [1] : 2) Feeling down, depressed, or hopeless for several days (1) [Good] : ~his/her~  mood as  good [No falls in past year] : Patient reported no falls in the past year [HIV test declined] : HIV test declined [Hepatitis C test declined] : Hepatitis C test declined [Behavioral] : behavioral [With Family] : lives with family [Unemployed] : unemployed [] :  [# Of Children ___] : has [unfilled] children [Feels Safe at Home] : Feels safe at home [Fully functional (bathing, dressing, toileting, transferring, walking, feeding)] : Fully functional (bathing, dressing, toileting, transferring, walking, feeding) [Fully functional (using the telephone, shopping, preparing meals, housekeeping, doing laundry, using] : Fully functional and needs no help or supervision to perform IADLs (using the telephone, shopping, preparing meals, housekeeping, doing laundry, using transportation, managing medications and managing finances) [de-identified] : 10-20 cigs/d [de-identified] : mainly holidays [de-identified] : no but starting yoga, has exer bike [de-identified] : somewhat healthy [Change in mental status noted] : No change in mental status noted [Language] : denies difficulty with language [High Risk Behavior] : no high risk behavior [Reports changes in hearing] : Reports no changes in hearing [Reports changes in vision] : Reports no changes in vision [de-identified] : agoraphobia- but is able to drive son daily to his programs

## 2021-11-05 NOTE — HISTORY OF PRESENT ILLNESS
[FreeTextEntry1] : cpe [de-identified] : 55 yo woman w obesity, agorophobia, anxiety, insomnia, smoking.\par Last here almost 2 yrs ago before pandemic hit.\par During pandemic, gained back the wt she's lost, increased her  smoking.\par Had done well in the past on Buproprion for smoking cessation, would like to resume.\par Had done well w [employer] wt loss program, plans to resume.\par \par Son, 24, has autism and she needs paperwork filled out to be paid as caretaker.\par She drives him daily to [facility] for a program, he's doing well.\par \par Mammo: overdue; pt w concern about extra skin/tissue right axilla.\par [place]: ref, will do FIT\par Gyn due\par \par Flu:today\par Covid had J & J 4/21\par Shingrix: will order to pharm

## 2021-11-05 NOTE — PHYSICAL EXAM
[No Acute Distress] : no acute distress [Well Nourished] : well nourished [Well Developed] : well developed [Well-Appearing] : well-appearing [Normal Sclera/Conjunctiva] : normal sclera/conjunctiva [PERRL] : pupils equal round and reactive to light [EOMI] : extraocular movements intact [Normal Outer Ear/Nose] : the outer ears and nose were normal in appearance [Normal Oropharynx] : the oropharynx was normal [No JVD] : no jugular venous distention [No Lymphadenopathy] : no lymphadenopathy [Supple] : supple [Thyroid Normal, No Nodules] : the thyroid was normal and there were no nodules present [No Respiratory Distress] : no respiratory distress  [No Accessory Muscle Use] : no accessory muscle use [Clear to Auscultation] : lungs were clear to auscultation bilaterally [Normal Rate] : normal rate  [Regular Rhythm] : with a regular rhythm [Normal S1, S2] : normal S1 and S2 [No Murmur] : no murmur heard [No Carotid Bruits] : no carotid bruits [No Abdominal Bruit] : a ~M bruit was not heard ~T in the abdomen [No Varicosities] : no varicosities [Pedal Pulses Present] : the pedal pulses are present [No Edema] : there was no peripheral edema [No Palpable Aorta] : no palpable aorta [No Extremity Clubbing/Cyanosis] : no extremity clubbing/cyanosis [Normal Appearance] : normal in appearance [No Axillary Lymphadenopathy] : no axillary lymphadenopathy [Soft] : abdomen soft [Non Tender] : non-tender [Non-distended] : non-distended [No Masses] : no abdominal mass palpated [No HSM] : no HSM [Normal Bowel Sounds] : normal bowel sounds [Normal Posterior Cervical Nodes] : no posterior cervical lymphadenopathy [Normal Anterior Cervical Nodes] : no anterior cervical lymphadenopathy [No CVA Tenderness] : no CVA  tenderness [No Spinal Tenderness] : no spinal tenderness [No Joint Swelling] : no joint swelling [Grossly Normal Strength/Tone] : grossly normal strength/tone [No Rash] : no rash [Coordination Grossly Intact] : coordination grossly intact [No Focal Deficits] : no focal deficits [Normal Gait] : normal gait [Deep Tendon Reflexes (DTR)] : deep tendon reflexes were 2+ and symmetric [Normal Affect] : the affect was normal [Normal Insight/Judgement] : insight and judgment were intact [de-identified] : prominent extra tissue (?fat pad) under right axilla

## 2021-11-10 LAB
25(OH)D3 SERPL-MCNC: 29.5 NG/ML
ALBUMIN SERPL ELPH-MCNC: 4.3 G/DL
ALP BLD-CCNC: 100 U/L
ALT SERPL-CCNC: 27 U/L
ANION GAP SERPL CALC-SCNC: 13 MMOL/L
AST SERPL-CCNC: 21 U/L
BASOPHILS # BLD AUTO: 0.06 K/UL
BASOPHILS NFR BLD AUTO: 0.8 %
BILIRUB SERPL-MCNC: 0.6 MG/DL
BUN SERPL-MCNC: 13 MG/DL
CALCIUM SERPL-MCNC: 9.4 MG/DL
CHLORIDE SERPL-SCNC: 104 MMOL/L
CHOLEST SERPL-MCNC: 277 MG/DL
CO2 SERPL-SCNC: 21 MMOL/L
CREAT SERPL-MCNC: 0.71 MG/DL
EOSINOPHIL # BLD AUTO: 0.25 K/UL
EOSINOPHIL NFR BLD AUTO: 3.2 %
ESTIMATED AVERAGE GLUCOSE: 123 MG/DL
GLUCOSE SERPL-MCNC: 100 MG/DL
HBA1C MFR BLD HPLC: 5.9 %
HCT VFR BLD CALC: 46.5 %
HDLC SERPL-MCNC: 40 MG/DL
HGB BLD-MCNC: 15.5 G/DL
IMM GRANULOCYTES NFR BLD AUTO: 0.3 %
LDLC SERPL CALC-MCNC: 200 MG/DL
LYMPHOCYTES # BLD AUTO: 2.19 K/UL
LYMPHOCYTES NFR BLD AUTO: 28.3 %
M TB IFN-G BLD-IMP: NEGATIVE
MAN DIFF?: NORMAL
MCHC RBC-ENTMCNC: 30.9 PG
MCHC RBC-ENTMCNC: 33.3 GM/DL
MCV RBC AUTO: 92.6 FL
MEV IGG FLD QL IA: 244 AU/ML
MEV IGG+IGM SER-IMP: POSITIVE
MONOCYTES # BLD AUTO: 0.55 K/UL
MONOCYTES NFR BLD AUTO: 7.1 %
MUV AB SER-ACNC: NEGATIVE
MUV IGG SER QL IA: <5 AU/ML
NEUTROPHILS # BLD AUTO: 4.67 K/UL
NEUTROPHILS NFR BLD AUTO: 60.3 %
NONHDLC SERPL-MCNC: 237 MG/DL
PLATELET # BLD AUTO: 255 K/UL
POTASSIUM SERPL-SCNC: 4.8 MMOL/L
PROT SERPL-MCNC: 7.2 G/DL
QUANTIFERON TB PLUS MITOGEN MINUS NIL: >10 IU/ML
QUANTIFERON TB PLUS NIL: 0.04 IU/ML
QUANTIFERON TB PLUS TB1 MINUS NIL: 0 IU/ML
QUANTIFERON TB PLUS TB2 MINUS NIL: 0 IU/ML
RBC # BLD: 5.02 M/UL
RBC # FLD: 13.3 %
RUBV IGG FLD-ACNC: 9.2 INDEX
RUBV IGG SER-IMP: POSITIVE
SODIUM SERPL-SCNC: 139 MMOL/L
TRIGL SERPL-MCNC: 182 MG/DL
TSH SERPL-ACNC: 3.76 UIU/ML
WBC # FLD AUTO: 7.74 K/UL

## 2021-12-06 ENCOUNTER — APPOINTMENT (OUTPATIENT)
Dept: INTERNAL MEDICINE | Facility: CLINIC | Age: 56
End: 2021-12-06

## 2022-02-13 ENCOUNTER — RX RENEWAL (OUTPATIENT)
Age: 57
End: 2022-02-13

## 2022-11-14 ENCOUNTER — APPOINTMENT (OUTPATIENT)
Dept: INTERNAL MEDICINE | Facility: CLINIC | Age: 57
End: 2022-11-14

## 2022-11-14 VITALS
SYSTOLIC BLOOD PRESSURE: 140 MMHG | WEIGHT: 212 LBS | BODY MASS INDEX: 39.01 KG/M2 | HEIGHT: 62 IN | DIASTOLIC BLOOD PRESSURE: 70 MMHG | TEMPERATURE: 97.3 F | OXYGEN SATURATION: 98 % | HEART RATE: 108 BPM

## 2022-11-14 DIAGNOSIS — F41.9 ANXIETY DISORDER, UNSPECIFIED: ICD-10-CM

## 2022-11-14 DIAGNOSIS — E66.9 OBESITY, UNSPECIFIED: ICD-10-CM

## 2022-11-14 DIAGNOSIS — Z00.00 ENCOUNTER FOR GENERAL ADULT MEDICAL EXAMINATION W/OUT ABNORMAL FINDINGS: ICD-10-CM

## 2022-11-14 DIAGNOSIS — Z23 ENCOUNTER FOR IMMUNIZATION: ICD-10-CM

## 2022-11-14 PROCEDURE — 99214 OFFICE O/P EST MOD 30 MIN: CPT | Mod: 25

## 2022-11-14 PROCEDURE — G0008: CPT

## 2022-11-14 PROCEDURE — 90686 IIV4 VACC NO PRSV 0.5 ML IM: CPT

## 2022-11-14 RX ORDER — SERTRALINE HYDROCHLORIDE 50 MG/1
50 TABLET, FILM COATED ORAL DAILY
Qty: 150 | Refills: 1 | Status: ACTIVE | COMMUNITY
Start: 2022-11-14 | End: 1900-01-01

## 2022-11-14 RX ORDER — BUPROPION HYDROCHLORIDE 150 MG/1
150 TABLET, FILM COATED, EXTENDED RELEASE ORAL DAILY
Qty: 90 | Refills: 0 | Status: DISCONTINUED | COMMUNITY
Start: 2019-10-28 | End: 2022-11-14

## 2022-11-14 NOTE — ASSESSMENT
[FreeTextEntry1] : Pt w anxiety ds/agorophobia.\par Smoking, isac when more anxious.\par Obesity, HLD, preDM; both parents early CAD/death\par \par Discussed smoking cessation,I think  anxiety must be managed which will help her be able to quit.\par Discussed healthy diet, exercise when able (drives son all day to his programs).\par \par Sleep can take Zquill, since Alleve PM helped her.\par Stop the CBD not helping.\par P:\par check routine labs\par Start Sertraline 50 and incr to 100 after one wk then f/h w me\par Behav health referral, will reach out to Prema.\par Flu shot\par Mammo\par f/u 1 mo\par \par

## 2022-11-14 NOTE — HISTORY OF PRESENT ILLNESS
[FreeTextEntry1] : f/u [de-identified] : 56 yo woman w obesity, agorophobia/anxiety, smoking, insomnia\par Had quit using Wellbutrin but then insur changed, also more anxiety-->smoking again.\par Has been using CBD am, pm not really helping.  Was using Alleve PM for sleep, a friend told her to stop. It was working.\par Anxiety worse lately. Somewhat crippling. Can care for family but not herself. Has seen CSW  LR in the past, amenable to f/u isac if remote, has trouble going out.\par  Ambenable to trying medication other than Wellbut.\par Needs form filled out/labs  for caretaking of 26 yo son,  autism.\par Flu shot: can do today\par Had Shingrix x 2\par Mammo due\par

## 2022-11-18 LAB
ALBUMIN SERPL ELPH-MCNC: 4.1 G/DL
ALP BLD-CCNC: 102 U/L
ALT SERPL-CCNC: 21 U/L
ANION GAP SERPL CALC-SCNC: 11 MMOL/L
AST SERPL-CCNC: 18 U/L
BASOPHILS # BLD AUTO: 0.06 K/UL
BASOPHILS NFR BLD AUTO: 0.8 %
BILIRUB SERPL-MCNC: 0.4 MG/DL
BUN SERPL-MCNC: 18 MG/DL
CALCIUM SERPL-MCNC: 9.7 MG/DL
CHLORIDE SERPL-SCNC: 105 MMOL/L
CHOLEST SERPL-MCNC: 247 MG/DL
CO2 SERPL-SCNC: 24 MMOL/L
CREAT SERPL-MCNC: 0.76 MG/DL
EGFR: 91 ML/MIN/1.73M2
EOSINOPHIL # BLD AUTO: 0.16 K/UL
EOSINOPHIL NFR BLD AUTO: 2.1 %
ESTIMATED AVERAGE GLUCOSE: 120 MG/DL
GLUCOSE SERPL-MCNC: 102 MG/DL
HBA1C MFR BLD HPLC: 5.8 %
HCT VFR BLD CALC: 45.5 %
HDLC SERPL-MCNC: 36 MG/DL
HGB BLD-MCNC: 15.7 G/DL
IMM GRANULOCYTES NFR BLD AUTO: 0.3 %
LDLC SERPL CALC-MCNC: 170 MG/DL
LYMPHOCYTES # BLD AUTO: 2.19 K/UL
LYMPHOCYTES NFR BLD AUTO: 28.8 %
M TB IFN-G BLD-IMP: NEGATIVE
MAN DIFF?: NORMAL
MCHC RBC-ENTMCNC: 31.2 PG
MCHC RBC-ENTMCNC: 34.5 GM/DL
MCV RBC AUTO: 90.5 FL
MONOCYTES # BLD AUTO: 0.52 K/UL
MONOCYTES NFR BLD AUTO: 6.8 %
NEUTROPHILS # BLD AUTO: 4.65 K/UL
NEUTROPHILS NFR BLD AUTO: 61.2 %
NONHDLC SERPL-MCNC: 210 MG/DL
PLATELET # BLD AUTO: 282 K/UL
POTASSIUM SERPL-SCNC: 4.1 MMOL/L
PROT SERPL-MCNC: 6.9 G/DL
QUANTIFERON TB PLUS MITOGEN MINUS NIL: 7.85 IU/ML
QUANTIFERON TB PLUS NIL: 0.04 IU/ML
QUANTIFERON TB PLUS TB1 MINUS NIL: 0 IU/ML
QUANTIFERON TB PLUS TB2 MINUS NIL: 0 IU/ML
RBC # BLD: 5.03 M/UL
RBC # FLD: 13.2 %
SODIUM SERPL-SCNC: 141 MMOL/L
TRIGL SERPL-MCNC: 202 MG/DL
TSH SERPL-ACNC: 4.1 UIU/ML
WBC # FLD AUTO: 7.6 K/UL

## 2023-01-13 ENCOUNTER — APPOINTMENT (OUTPATIENT)
Dept: INTERNAL MEDICINE | Facility: CLINIC | Age: 58
End: 2023-01-13

## 2024-05-06 NOTE — ED ADULT NURSE NOTE - NSSEPSISNEWALTERMENTAL_ED_A_ED
Patient seen and examined at bedside.    I reviewed and interpreted CT images.    Continue abx.    WIll follow.
No